# Patient Record
Sex: MALE | Race: OTHER | NOT HISPANIC OR LATINO | URBAN - METROPOLITAN AREA
[De-identification: names, ages, dates, MRNs, and addresses within clinical notes are randomized per-mention and may not be internally consistent; named-entity substitution may affect disease eponyms.]

---

## 2023-07-04 ENCOUNTER — EMERGENCY (EMERGENCY)
Facility: HOSPITAL | Age: 48
LOS: 1 days | Discharge: HOME | End: 2023-07-04
Attending: EMERGENCY MEDICINE | Admitting: EMERGENCY MEDICINE
Payer: COMMERCIAL

## 2023-07-04 VITALS
HEART RATE: 72 BPM | SYSTOLIC BLOOD PRESSURE: 108 MMHG | RESPIRATION RATE: 18 BRPM | OXYGEN SATURATION: 94 % | TEMPERATURE: 99 F | DIASTOLIC BLOOD PRESSURE: 72 MMHG | WEIGHT: 190.04 LBS

## 2023-07-04 DIAGNOSIS — M25.511 PAIN IN RIGHT SHOULDER: ICD-10-CM

## 2023-07-04 DIAGNOSIS — M54.2 CERVICALGIA: ICD-10-CM

## 2023-07-04 DIAGNOSIS — M79.601 PAIN IN RIGHT ARM: ICD-10-CM

## 2023-07-04 LAB
ANION GAP SERPL CALC-SCNC: 8 MMOL/L — LOW (ref 9–16)
BUN SERPL-MCNC: 9 MG/DL — SIGNIFICANT CHANGE UP (ref 7–23)
CALCIUM SERPL-MCNC: 9.3 MG/DL — SIGNIFICANT CHANGE UP (ref 8.5–10.5)
CHLORIDE SERPL-SCNC: 102 MMOL/L — SIGNIFICANT CHANGE UP (ref 96–108)
CO2 SERPL-SCNC: 28 MMOL/L — SIGNIFICANT CHANGE UP (ref 22–31)
CREAT SERPL-MCNC: 0.95 MG/DL — SIGNIFICANT CHANGE UP (ref 0.5–1.3)
EGFR: 100 ML/MIN/1.73M2 — SIGNIFICANT CHANGE UP
GLUCOSE SERPL-MCNC: 93 MG/DL — SIGNIFICANT CHANGE UP (ref 70–99)
POTASSIUM SERPL-MCNC: 4.1 MMOL/L — SIGNIFICANT CHANGE UP (ref 3.5–5.3)
POTASSIUM SERPL-SCNC: 4.1 MMOL/L — SIGNIFICANT CHANGE UP (ref 3.5–5.3)
SODIUM SERPL-SCNC: 138 MMOL/L — SIGNIFICANT CHANGE UP (ref 132–145)

## 2023-07-04 PROCEDURE — 99285 EMERGENCY DEPT VISIT HI MDM: CPT

## 2023-07-04 PROCEDURE — 70491 CT SOFT TISSUE NECK W/DYE: CPT | Mod: 26

## 2023-07-04 RX ORDER — KETOROLAC TROMETHAMINE 30 MG/ML
30 SYRINGE (ML) INJECTION ONCE
Refills: 0 | Status: DISCONTINUED | OUTPATIENT
Start: 2023-07-04 | End: 2023-07-04

## 2023-07-04 RX ADMIN — Medication 30 MILLIGRAM(S): at 17:07

## 2023-07-04 NOTE — ED PROVIDER NOTE - NSFOLLOWUPINSTRUCTIONS_ED_ALL_ED_FT
THE AREA OF YOUR PAIN IS AT THE POINT WHERE A NECK MUSCLE ATTACHES TO THE BONE    THERE IS A SMALL IRREGULARITY TO THE SURFACE ON THE BONE IN THIS AREA ON YOUR CAT SCAN.  THIS COULD MEAN THAT YOU HAVE AN INJURY TO THE MUSCLE LIKE A TEAR OR A TEAR TO THE TENDON WHICH IS THE BAND LIKE STRUCTURE THAT ATTACHES THE MUSCLE TO THE BONE.  IF THIS IS WHAT IS CAUSING THE PAIN, IT SHOULD GRADUALLY RESOLVE OVER A WEEK.    ALL OTHER STRUCTURES IN THE NECK--BLOOD VESSELS, LYMPH NODES, AIRWAY TUBES LOOKED HEALTHY.     FOR NOW, AVOID ACTIVITY THAT WILL WORSEN THE PAIN  PUT AN ICE PACK OVER THE AREA FOR 15 MINUTES 4 TIMES A DAY  DO NOT SLEEP WITH ICE PACKS  MOTRIN 600 MG THREE TIMES A DAY FOR PAIN. YOU CAN ALSO TAKE TYLENOL ACCORDING TO THE BOTTLE INSTRUCTIONS    RETURN TO THE EMERGENCY ROOM IMMEDIATELY:  SWELLING TO THE NECK  TROUBLE BREATHING OR SWALLOWING  HEADACHE  NUMBNESS, TINGLING OR WEAKNESS TO THE FACE, ARMS OR LEGS  CHANGE IN VISION  VOMITING  REDNESS TO THE SKIN  SEVERE PAIN  ANY NEW, WORSENING OR CONCERNING SYMPTOMS

## 2023-07-04 NOTE — ED PROVIDER NOTE - PHYSICAL EXAMINATION
General:  Well appearing, no distress  HEENT:  No conjunctival injection, neck supple, no congestion.  No cervical spine tenderness.  There is moderate tenderness to the medial aspect of the medial clavicular head as well as at the base of the anterior SCM at its attachment to the clavicle. No sts, no edema, no erythema.    Chest:  Non-tender, no crepitance  Lungs:  Clear to auscultation bilaterally   Heart:  s1s2 normal, no murmur  Abdomen:  soft, non-tender, non-distended  :  Deferred  Rectal:  Deferred  Extremities: No edema, normal perfusion, no joint swelling or tenderness  Neuro:  Alert, conversant, motor/sensory grossly intact   Psychiatry:  Calm, cooperative, no expression of suicidal or homicidal ideation

## 2023-07-04 NOTE — ED PROVIDER NOTE - CLINICAL SUMMARY MEDICAL DECISION MAKING FREE TEXT BOX
Patient with pain to the area of the distal anterior SCM where it attaches to the clavicle. Although no clear injury, patient does practice martial arts and was working out the day before onset.  Accordingly, muscle strain/tear is possible.  Less likely a vascular injury as there is no swelling.  No neuro symptoms to suggest dissection.  Will obtain CT of the region to better assess.    19:30:  Discuss results in detail with the patient. Advise supportive care.  Strict return precautions reviewed as outlined below.  Patient verbalizes understanding of discussion and plan.

## 2023-07-04 NOTE — ED PROVIDER NOTE - OBJECTIVE STATEMENT
49 yo M c/o anterior neck pain, worse with touching the area or moving right arm.  Pain is in the area of the medial clavicular head at the base of the attachment of the anterior scm which is moderately tender. There is no overlying sts, edema or erythema.  Patient denies: ha, cp, sob, dysphagia, odynophagia, numbness, tingling, weakness or visual changes.  No specific injury, but patient practices martial arts and was working out the day before onset.  No dizziness/near-syncope.

## 2023-07-04 NOTE — ED ADULT TRIAGE NOTE - CHIEF COMPLAINT QUOTE
walk in c/o rt clavicle pain radiating to shoulder x 3 days. denies injury/trauma to area but states he boxes to work out. +limited ROM. slight rt shoulder drop noted, pt states it feels muscular.

## 2023-07-04 NOTE — ED PROVIDER NOTE - PATIENT PORTAL LINK FT
You can access the FollowMyHealth Patient Portal offered by [facility] by registering at the following website: [email or web address]. By joining [facility]’s FollowMyHealth portal, you will also be able to view your health information using other applications (apps) compatible with our system.

## 2023-07-05 NOTE — ED POST DISCHARGE NOTE - OTHER COMMUNICATION
7/5 Patient told person making follow up calls that he is still in pain. requested call back. I spoke to patient. He said pain is unchanged. gets mild relief from ibuprofen. I recommended continuing ibuprofen, f/u with orthopedic doctor. Return to the ED immediately if getting worse, not improving, or if having any new or troubling symptoms.

## 2023-07-06 PROBLEM — Z78.9 OTHER SPECIFIED HEALTH STATUS: Chronic | Status: ACTIVE | Noted: 2023-07-04

## 2023-07-11 PROBLEM — Z00.00 ENCOUNTER FOR PREVENTIVE HEALTH EXAMINATION: Status: ACTIVE | Noted: 2023-07-11

## 2023-07-13 ENCOUNTER — APPOINTMENT (OUTPATIENT)
Dept: ORTHOPEDIC SURGERY | Facility: CLINIC | Age: 48
End: 2023-07-13